# Patient Record
Sex: FEMALE | Race: WHITE | Employment: OTHER | URBAN - METROPOLITAN AREA
[De-identification: names, ages, dates, MRNs, and addresses within clinical notes are randomized per-mention and may not be internally consistent; named-entity substitution may affect disease eponyms.]

---

## 2022-11-21 ENCOUNTER — OFFICE VISIT (OUTPATIENT)
Dept: PRIMARY CARE CLINIC | Age: 77
End: 2022-11-21
Payer: MEDICARE

## 2022-11-21 VITALS
WEIGHT: 167 LBS | BODY MASS INDEX: 30.54 KG/M2 | DIASTOLIC BLOOD PRESSURE: 70 MMHG | OXYGEN SATURATION: 93 % | SYSTOLIC BLOOD PRESSURE: 120 MMHG | TEMPERATURE: 98.3 F | HEART RATE: 94 BPM

## 2022-11-21 DIAGNOSIS — I77.6 VASCULITIS (HCC): Primary | ICD-10-CM

## 2022-11-21 PROCEDURE — 4004F PT TOBACCO SCREEN RCVD TLK: CPT | Performed by: PHYSICIAN ASSISTANT

## 2022-11-21 PROCEDURE — G8419 CALC BMI OUT NRM PARAM NOF/U: HCPCS | Performed by: PHYSICIAN ASSISTANT

## 2022-11-21 PROCEDURE — 3017F COLORECTAL CA SCREEN DOC REV: CPT | Performed by: PHYSICIAN ASSISTANT

## 2022-11-21 PROCEDURE — G8400 PT W/DXA NO RESULTS DOC: HCPCS | Performed by: PHYSICIAN ASSISTANT

## 2022-11-21 PROCEDURE — 1123F ACP DISCUSS/DSCN MKR DOCD: CPT | Performed by: PHYSICIAN ASSISTANT

## 2022-11-21 PROCEDURE — G8427 DOCREV CUR MEDS BY ELIG CLIN: HCPCS | Performed by: PHYSICIAN ASSISTANT

## 2022-11-21 PROCEDURE — G8484 FLU IMMUNIZE NO ADMIN: HCPCS | Performed by: PHYSICIAN ASSISTANT

## 2022-11-21 PROCEDURE — 1090F PRES/ABSN URINE INCON ASSESS: CPT | Performed by: PHYSICIAN ASSISTANT

## 2022-11-21 PROCEDURE — 99213 OFFICE O/P EST LOW 20 MIN: CPT | Performed by: PHYSICIAN ASSISTANT

## 2022-11-21 RX ORDER — PREDNISONE 20 MG/1
20 TABLET ORAL 2 TIMES DAILY
Qty: 10 TABLET | Refills: 0 | Status: SHIPPED | OUTPATIENT
Start: 2022-11-21 | End: 2022-11-26

## 2022-11-21 ASSESSMENT — ENCOUNTER SYMPTOMS: RESPIRATORY NEGATIVE: 1

## 2022-11-21 NOTE — PROGRESS NOTES
[place] [address] [place]  [address] [facility]  Phone: [phone]  Fax: Daryl Acevedo    Pt Name: Nancie Mendoza  MRN: [id number]  [place] 1945  Date of evaluation: 11/21/2022  Provider: Negra Trinh PA-C     CHIEF COMPLAINT       Chief Complaint   Patient presents with    Cellulitis     Right leg redness, swelling, warm to the touch. Just completed a 5 day antibiotic from her family doc but it didn't help. She states that she was on Doxycycline. HISTORY OF PRESENT ILLNESS  (Location/Symptom, Timing/Onset, Context/Setting, Quality, Duration, Modifying Factors, Severity.)   Nancie Mendoza is a 78 y.o. White (non-) [1] female who presents to the office for evaluation of      Patient presents for possible cellulitis- Patient was just on 5 days of doxycycline with no improvement- Patients PCP indicated they thought it might be vascular related       Nursing Notes were reviewed. REVIEW OF SYSTEMS    (2-9 systems for level 4, 10 or more for level 5)     Review of Systems   Constitutional:  Negative for chills, diaphoresis, fatigue and fever. HENT: Negative. Respiratory: Negative. Cardiovascular: Negative. Skin:         Right lower leg/ right ankle redness and very mild swelling. - minor itching. Except as noted above the remainder of the review of systems was reviewed andnegative. PAST MEDICAL HISTORY   History reviewed. Past Medical History:   Diagnosis Date    Arthritis     PBC (primary biliary cirrhosis) ([facility] [address]     Thyroid disease          SURGICAL HISTORY     History reviewed.     Past Surgical History:   Procedure Laterality Date    CHOLECYSTECTOMY      EYE SURGERY Right     scleral buckle    HYSTERECTOMY      TONSILLECTOMY           CURRENT MEDICATIONS       Current Outpatient Medications   Medication Sig Dispense Refill    predniSONE (DELTASONE) 20 MG tablet Take 1 tablet by mouth 2 times daily for 5 days 10 tablet 0    HYDROcodone-acetaminophen (NORCO) 5-325 MG per tablet Take 1 tablet by mouth every 6 hours as needed for Pain.      vitamin B-12 (CYANOCOBALAMIN) 1000 MCG tablet Take 1,000 mcg by mouth daily. ursodiol (ACTIGALL) 300 MG capsule Take 500 mg by mouth 2 times daily. zolpidem (AMBIEN) 10 MG tablet Take  by mouth nightly as needed for Sleep.      raloxifene (EVISTA) 60 MG tablet Take 60 mg by mouth daily. levothyroxine (SYNTHROID) 112 MCG tablet Take 112 mcg by mouth Daily. ALPRAZolam (XANAX) 0.25 MG tablet Take 0.25 mg by mouth nightly as needed for Sleep. brimonidine (ALPHAGAN) 0.2 % ophthalmic solution One drop to operative eye twice daily. 1 Bottle 0    atropine 1 % ophthalmic solution One drop to operative eye daily 1 Bottle 0    gentamicin-prednisoLONE (PRED-G) 0.3-1 % ophthalmic drops Apply one drop to operative eye 4 times daily 1 Bottle 0    tobramycin-dexamethasone (TOBRADEX) 0.3-0.1 % ophthalmic ointment apply to operative eye three times daily as needed for comfort 1 Tube 0     No current facility-administered medications for this visit. ALLERGIES     Pcn [penicillins]    FAMILY HISTORY           Problem Relation Age of Onset    Diabetes Sister     Diabetes Brother     Stroke Brother      No family status information on file. SOCIAL HISTORY      reports that she has never smoked. She does not have any smokeless tobacco history on file. PHYSICAL EXAM    (up to 7 for level 4, 8 or more for level 5)     Vitals:    11/21/22 1430   BP: 120/70   Site: Left Upper Arm   Position: Sitting   Cuff Size: Medium Adult   Pulse: 94   Temp: 98.3 °F (36.8 °C)   SpO2: 93%   Weight: 167 lb (75.8 kg)         Physical Exam  Vitals and nursing note reviewed. Constitutional:       General: She is not in acute distress. Appearance: Normal appearance. She is not ill-appearing. HENT:      Head: Normocephalic and atraumatic.       Right Ear: External ear normal. Left Ear: External ear normal.      Nose: Nose normal.      Mouth/Throat:      Mouth: Mucous membranes are moist.   Eyes:      Extraocular Movements: Extraocular movements intact. Conjunctiva/sclera: Conjunctivae normal.      Pupils: Pupils are equal, round, and reactive to light. Pulmonary:      Effort: Pulmonary effort is normal.   Abdominal:      Palpations: Abdomen is soft. Musculoskeletal:        Legs:    Neurological:      Mental Status: She is alert and oriented to person, place, and time. DIFFERENTIAL DIAGNOSIS:       Brooke Hackett reviewed the disposition diagnosis with the patient and or their family/guardian. I have answered their questions and given discharge instructions. They voiced understanding of these instructions and did not have anyfurther questions or complaints. PROCEDURES:  No orders of the defined types were placed in this encounter. No results found for this visit on 11/21/22. FINALIMPRESSION      Visit Diagnoses and Associated Orders       Vasculitis ([facility] [address]    -  Primary         ORDERS WITHOUT AN ASSOCIATED DIAGNOSIS    predniSONE (DELTASONE) 20 MG tablet [6496]                PLAN     Return if symptoms worsen or fail to improve. DISCHARGEMEDICATIONS:  Orders Placed This Encounter   Medications    predniSONE (DELTASONE) 20 MG tablet     Sig: Take 1 tablet by mouth 2 times daily for 5 days     Dispense:  10 tablet     Refill:  0         Plan:  Based on presenting symptoms I believe this may be vasculitis   Prednisone given  - patient educated on signs and symptoms of cellulitis   Patient instructed to return to the office if symptoms worsen, return, or have any other concerns. Patient understands and is agreeable.          Minoo Cullen PA-C 11/21/2022 3:22 PM

## 2023-05-20 ENCOUNTER — HOSPITAL ENCOUNTER (OUTPATIENT)
Age: 78
Setting detail: SPECIMEN
Discharge: HOME | End: 2023-05-20

## 2023-05-20 ENCOUNTER — OFFICE VISIT (OUTPATIENT)
Dept: PRIMARY CARE CLINIC | Age: 78
End: 2023-05-20
Payer: MEDICARE

## 2023-05-20 VITALS
WEIGHT: 159 LBS | SYSTOLIC BLOOD PRESSURE: 114 MMHG | TEMPERATURE: 98.8 F | DIASTOLIC BLOOD PRESSURE: 70 MMHG | HEART RATE: 87 BPM | OXYGEN SATURATION: 100 % | BODY MASS INDEX: 29.08 KG/M2

## 2023-05-20 DIAGNOSIS — R35.0 URINARY FREQUENCY: Primary | ICD-10-CM

## 2023-05-20 DIAGNOSIS — R35.0 URINARY FREQUENCY: ICD-10-CM

## 2023-05-20 LAB
BILIRUBIN, POC: ABNORMAL
BLOOD URINE, POC: ABNORMAL
CLARITY, POC: ABNORMAL
COLOR, POC: ABNORMAL
GLUCOSE URINE, POC: NEGATIVE
KETONES, POC: NEGATIVE
LEUKOCYTE EST, POC: ABNORMAL
NITRITE, POC: NEGATIVE
PH, POC: 5.5
PROTEIN, POC: ABNORMAL
SPECIFIC GRAVITY, POC: 1.02
UROBILINOGEN, POC: 1

## 2023-05-20 PROCEDURE — 1123F ACP DISCUSS/DSCN MKR DOCD: CPT | Performed by: FAMILY MEDICINE

## 2023-05-20 PROCEDURE — 99214 OFFICE O/P EST MOD 30 MIN: CPT | Performed by: FAMILY MEDICINE

## 2023-05-20 PROCEDURE — G8400 PT W/DXA NO RESULTS DOC: HCPCS | Performed by: FAMILY MEDICINE

## 2023-05-20 PROCEDURE — 81003 URINALYSIS AUTO W/O SCOPE: CPT | Performed by: FAMILY MEDICINE

## 2023-05-20 PROCEDURE — 1036F TOBACCO NON-USER: CPT | Performed by: FAMILY MEDICINE

## 2023-05-20 PROCEDURE — 1090F PRES/ABSN URINE INCON ASSESS: CPT | Performed by: FAMILY MEDICINE

## 2023-05-20 PROCEDURE — G8427 DOCREV CUR MEDS BY ELIG CLIN: HCPCS | Performed by: FAMILY MEDICINE

## 2023-05-20 PROCEDURE — G8419 CALC BMI OUT NRM PARAM NOF/U: HCPCS | Performed by: FAMILY MEDICINE

## 2023-05-20 RX ORDER — SULFAMETHOXAZOLE AND TRIMETHOPRIM 800; 160 MG/1; MG/1
1 TABLET ORAL 2 TIMES DAILY
Qty: 10 TABLET | Refills: 0 | Status: SHIPPED | OUTPATIENT
Start: 2023-05-20 | End: 2023-05-25

## 2023-05-20 RX ORDER — LACTULOSE 10 G/15ML
SOLUTION ORAL
COMMUNITY
Start: 2022-03-09

## 2023-05-20 RX ORDER — OMEPRAZOLE 20 MG/1
CAPSULE, DELAYED RELEASE ORAL
COMMUNITY
Start: 2023-03-13

## 2023-05-20 RX ORDER — URSODIOL 500 MG/1
TABLET, FILM COATED ORAL 2 TIMES DAILY
COMMUNITY
Start: 2023-05-20

## 2023-05-20 RX ORDER — PHENAZOPYRIDINE HYDROCHLORIDE 200 MG/1
200 TABLET, FILM COATED ORAL 3 TIMES DAILY PRN
Qty: 9 TABLET | Refills: 0 | Status: SHIPPED | OUTPATIENT
Start: 2023-05-20 | End: 2023-05-23

## 2023-05-20 RX ORDER — ASPIRIN 81 MG/1
TABLET ORAL
COMMUNITY
Start: 2023-03-13

## 2023-05-20 RX ORDER — FUROSEMIDE 20 MG/1
TABLET ORAL DAILY
COMMUNITY
Start: 2020-12-04

## 2023-05-20 NOTE — PROGRESS NOTES
Subjective:  Rosemarie Cullen presents for   Chief Complaint   Patient presents with    Urinary Frequency     Urinary frequency for a couple of days. 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 Vitals:    05/20/23 1244   BP: 114/70   Site: Left Upper Arm   Position: Sitting   Cuff Size: Medium Adult   Pulse: 87   Temp: 98.8 °F (37.1 °C)   TempSrc: Tympanic   SpO2: 100%   Weight: 159 lb (72.1 kg)       Constitutional: She is oriented to person, place, and time. She appears well-developed and well-nourished and in no acute distress. Answers all my questions appropriately. No cva tenderness    Abdomen: No distension noted.  + bowel sounds in all quadrants which are normoactive. No bruits noted. No masses could be palpated. No unusual pulsatile masses noted. To deep palpation, patient denied any significant pain. No rebound, guarding or rigidity noted to my exam.        Assessment:   Encounter Diagnosis   Name Primary?     Urinary frequency Yes         Plan:     Ua looks supsicoia    Push fluids    Medications Discontinued During This Encounter   Medication Reason    ursodiol (ACTIGALL) 300 MG capsule DOSE ADJUSTMENT    tobramycin-dexamethasone (TOBRADEX) 0.3-0.1 % ophthalmic ointment LIST CLEANUP    gentamicin-prednisoLONE (PRED-G) 0.3-1 % ophthalmic drops LIST CLEANUP    brimonidine (ALPHAGAN) 0.2 % ophthalmic solution LIST CLEANUP    atropine 1 % ophthalmic solution LIST CLEANUP     THE ABOVE NOTED DISCONTINUED MEDS MAY ONLY BE FROM 'CLEANING UP' THE MED

## 2023-05-21 LAB
MICROORGANISM SPEC CULT: ABNORMAL
SPECIMEN DESCRIPTION: ABNORMAL

## 2023-05-22 LAB
MICROORGANISM SPEC CULT: ABNORMAL
SPECIMEN DESCRIPTION: ABNORMAL

## 2023-07-21 ENCOUNTER — OFFICE VISIT (OUTPATIENT)
Dept: PRIMARY CARE CLINIC | Age: 78
End: 2023-07-21
Payer: MEDICARE

## 2023-07-21 VITALS
TEMPERATURE: 97.9 F | HEART RATE: 66 BPM | WEIGHT: 159 LBS | SYSTOLIC BLOOD PRESSURE: 118 MMHG | OXYGEN SATURATION: 98 % | BODY MASS INDEX: 29.08 KG/M2 | DIASTOLIC BLOOD PRESSURE: 62 MMHG

## 2023-07-21 DIAGNOSIS — L03.114 CELLULITIS OF LEFT UPPER EXTREMITY: Primary | ICD-10-CM

## 2023-07-21 PROCEDURE — 1090F PRES/ABSN URINE INCON ASSESS: CPT | Performed by: FAMILY MEDICINE

## 2023-07-21 PROCEDURE — G8419 CALC BMI OUT NRM PARAM NOF/U: HCPCS | Performed by: FAMILY MEDICINE

## 2023-07-21 PROCEDURE — G8427 DOCREV CUR MEDS BY ELIG CLIN: HCPCS | Performed by: FAMILY MEDICINE

## 2023-07-21 PROCEDURE — G8400 PT W/DXA NO RESULTS DOC: HCPCS | Performed by: FAMILY MEDICINE

## 2023-07-21 PROCEDURE — 99214 OFFICE O/P EST MOD 30 MIN: CPT | Performed by: FAMILY MEDICINE

## 2023-07-21 PROCEDURE — 1036F TOBACCO NON-USER: CPT | Performed by: FAMILY MEDICINE

## 2023-07-21 PROCEDURE — 1123F ACP DISCUSS/DSCN MKR DOCD: CPT | Performed by: FAMILY MEDICINE

## 2023-07-21 RX ORDER — DOXYCYCLINE HYCLATE 100 MG
100 TABLET ORAL 2 TIMES DAILY
Qty: 20 TABLET | Refills: 0 | Status: SHIPPED | OUTPATIENT
Start: 2023-07-21 | End: 2023-07-31

## 2024-06-24 ENCOUNTER — OFFICE VISIT (OUTPATIENT)
Dept: PRIMARY CARE CLINIC | Age: 79
End: 2024-06-24
Payer: MEDICARE

## 2024-06-24 VITALS
DIASTOLIC BLOOD PRESSURE: 66 MMHG | HEART RATE: 74 BPM | OXYGEN SATURATION: 98 % | WEIGHT: 160 LBS | BODY MASS INDEX: 29.26 KG/M2 | SYSTOLIC BLOOD PRESSURE: 112 MMHG | TEMPERATURE: 98.9 F

## 2024-06-24 DIAGNOSIS — L03.115 CELLULITIS OF RIGHT LOWER EXTREMITY: Primary | ICD-10-CM

## 2024-06-24 DIAGNOSIS — S61.412A SKIN TEAR OF LEFT HAND WITHOUT COMPLICATION, INITIAL ENCOUNTER: ICD-10-CM

## 2024-06-24 PROCEDURE — 99213 OFFICE O/P EST LOW 20 MIN: CPT | Performed by: PHYSICIAN ASSISTANT

## 2024-06-24 PROCEDURE — 1123F ACP DISCUSS/DSCN MKR DOCD: CPT | Performed by: PHYSICIAN ASSISTANT

## 2024-06-24 RX ORDER — SULFAMETHOXAZOLE AND TRIMETHOPRIM 800; 160 MG/1; MG/1
1 TABLET ORAL 2 TIMES DAILY
Qty: 14 TABLET | Refills: 0 | Status: SHIPPED | OUTPATIENT
Start: 2024-06-24 | End: 2024-07-01

## 2024-06-24 RX ORDER — ROMOSOZUMAB-AQQG 105 MG/1.17ML
210 INJECTION, SOLUTION SUBCUTANEOUS
COMMUNITY
Start: 2023-11-06

## 2024-06-24 NOTE — PROGRESS NOTES
[facility]  [address] [place] [address]  Phone: [phone]  Fax: [phone]       [facility] [place]    Pt Name: Leah Charlton  MRN: [id number]  Birthdate 1945  Date of evaluation: 6/24/2024  Provider: Carine Walsh PA-C     CHIEF COMPLAINT       Chief Complaint   Patient presents with    Hand Injury     Left hand abrasion for 2-4 days. Hit left hand in a door at home 2 days ago then hit same hand at daughters house 4 days ago.     Sore     Sore on right lower leg for 2 weeks.            HISTORY OF PRESENT ILLNESS  (Location/Symptom, Timing/Onset, Context/Setting, Quality, Duration, Modifying Factors, Severity.)   Leah Charlton is a 79 y.o. White (non-) [1] female who presents to the office for evaluation of      Patient presents for evaluation of left hand wound and sore to right lower leg x 2 weeks.         Nursing Notes were reviewed.    REVIEW OF SYSTEMS    (2-9 systems for level 4, 10 or more for level 5)     Review of Systems   Constitutional:  Negative for diaphoresis, fatigue and fever.   HENT: Negative.     Respiratory: Negative.     Cardiovascular: Negative.    Gastrointestinal: Negative.    Genitourinary: Negative.    Skin:  Positive for wound.        Skin tear to hand and sore on lower leg         Except as noted above the remainder of the review of systems was reviewed andnegative.       PAST MEDICAL HISTORY   History reviewed.    Past Medical History:   Diagnosis Date    Arthritis     PBC (primary biliary cirrhosis)     Thyroid disease          SURGICAL HISTORY     History reviewed.    Past Surgical History:   Procedure Laterality Date    CHOLECYSTECTOMY      EYE SURGERY Right     scleral buckle    HYSTERECTOMY (CERVIX STATUS UNKNOWN)      TONSILLECTOMY           CURRENT MEDICATIONS       Current Outpatient Medications   Medication Sig Dispense Refill    romosozumab-aqqg (EVENITY) 105 MG/1.17ML SOSY injection Inject 2.34 mLs into the skin every 30 days

## 2024-06-25 ASSESSMENT — ENCOUNTER SYMPTOMS
GASTROINTESTINAL NEGATIVE: 1
RESPIRATORY NEGATIVE: 1

## 2024-07-02 ENCOUNTER — OFFICE VISIT (OUTPATIENT)
Dept: PRIMARY CARE CLINIC | Age: 79
End: 2024-07-02
Payer: MEDICARE

## 2024-07-02 VITALS
BODY MASS INDEX: 28.72 KG/M2 | SYSTOLIC BLOOD PRESSURE: 106 MMHG | TEMPERATURE: 97.5 F | DIASTOLIC BLOOD PRESSURE: 64 MMHG | HEART RATE: 72 BPM | OXYGEN SATURATION: 99 % | WEIGHT: 157 LBS

## 2024-07-02 DIAGNOSIS — S61.411S: Primary | ICD-10-CM

## 2024-07-02 DIAGNOSIS — L03.113 CELLULITIS OF RIGHT WRIST: ICD-10-CM

## 2024-07-02 PROCEDURE — 99213 OFFICE O/P EST LOW 20 MIN: CPT | Performed by: PHYSICIAN ASSISTANT

## 2024-07-02 PROCEDURE — 1123F ACP DISCUSS/DSCN MKR DOCD: CPT | Performed by: PHYSICIAN ASSISTANT

## 2024-07-02 RX ORDER — HYDROCODONE BITARTRATE AND ACETAMINOPHEN 10; 325 MG/1; MG/1
TABLET ORAL
COMMUNITY
Start: 2024-07-01

## 2024-07-02 RX ORDER — LEVOTHYROXINE SODIUM 88 UG/1
TABLET ORAL
COMMUNITY
Start: 2024-05-30

## 2024-07-02 RX ORDER — DOXYCYCLINE HYCLATE 100 MG
100 TABLET ORAL 2 TIMES DAILY
Qty: 20 TABLET | Refills: 0 | Status: SHIPPED | OUTPATIENT
Start: 2024-07-02 | End: 2024-07-12

## 2024-07-02 ASSESSMENT — ENCOUNTER SYMPTOMS
GASTROINTESTINAL NEGATIVE: 1
RESPIRATORY NEGATIVE: 1

## 2024-07-02 NOTE — PROGRESS NOTES
improve.      DISCHARGEMEDICATIONS:  Orders Placed This Encounter   Medications    doxycycline hyclate (VIBRA-TABS) 100 MG tablet     Sig: Take 1 tablet by mouth 2 times daily for 10 days     Dispense:  20 tablet     Refill:  0    mupirocin (BACTROBAN) 2 % ointment     Sig: Apply topically 3 times daily.     Dispense:  1 each     Refill:  0         Plan:  Patient instructed to complete antibiotic prescription fully.  Warm compresses TID for 15 minutes at a time.  Tylenol/Motrin as needed for the discomfort/fever.  Patient agreeable to treatment plan.  Educational materials provided on AVS.  Follow up if symptoms do not improve/worsen.    The wound is cleansed, debrided of foreign material as much as possible, and dressed. The patient is alerted to watch for any signs of infection (redness, pus, pain, increased swelling or fever) and call if such occurs.    Patient instructed to return to the office if symptoms worsen, return, or have any other concerns.Patient understands and is agreeable.         Carine Walsh PA-C 7/2/2024 3:38 PM

## 2025-05-28 ENCOUNTER — HOSPITAL ENCOUNTER (EMERGENCY)
Age: 80
Discharge: HOME | End: 2025-05-28
Attending: EMERGENCY MEDICINE
Payer: MEDICARE

## 2025-05-28 VITALS
BODY MASS INDEX: 24.75 KG/M2 | RESPIRATION RATE: 18 BRPM | SYSTOLIC BLOOD PRESSURE: 113 MMHG | HEIGHT: 64 IN | WEIGHT: 145 LBS | DIASTOLIC BLOOD PRESSURE: 72 MMHG | TEMPERATURE: 97.7 F | OXYGEN SATURATION: 94 % | HEART RATE: 91 BPM

## 2025-05-28 DIAGNOSIS — T17.308A CHOKING, INITIAL ENCOUNTER: Primary | ICD-10-CM

## 2025-05-28 PROCEDURE — 99283 EMERGENCY DEPT VISIT LOW MDM: CPT

## 2025-05-28 ASSESSMENT — PAIN - FUNCTIONAL ASSESSMENT: PAIN_FUNCTIONAL_ASSESSMENT: NONE - DENIES PAIN

## 2025-05-28 NOTE — ED PROVIDER NOTES
[facility] Emergency Department  [address]  [facility] [address]  Phone: [phone]  Fax: [phone]      Patient Name:  Leah Charlton  Medical Record Number:  [id number]  YOB: 1945  Date of Service:  5/28/2025  Primary Care Physician:  Juanito Beckham MD      CHIEF COMPLAINT:       Chief Complaint   Patient presents with    food bolus     Patient was eating a sub sandwich tonight around 1830 and started choking. Patients airway is patent but states \" she feels like she still has something deep down stuck\". Patient is able to swallow her own secretions at this time.        HISTORY OF PRESENT ILLNESS:    Leah Charlton is a 80 y.o. female who presents with the complaint of staff just pain after a choking event.  Prior to arrival patient choked on a large bite of [employer] sandwich the event required the Heimlich maneuver which did bring up the bite of sandwich however after that she still had some difficulty swallowing and felt like something was still stuck.  Upon arrival patient does not have voice changes, she is able to drink half a glass of water, she is not hypoxic she is able to talk full sentences.    CURRENT MEDICATIONS:      Previous Medications    ALPRAZOLAM (XANAX) 0.25 MG TABLET    Take 0.25 mg by mouth nightly as needed for Sleep.    ASPIRIN 81 MG EC TABLET    Take by mouth    FUROSEMIDE (LASIX) 20 MG TABLET    Take by mouth daily    HYDROCODONE-ACETAMINOPHEN (NORCO)  MG PER TABLET        LACTULOSE (CHRONULAC) 10 GM/15ML SOLUTION    Take by mouth    LEVOTHYROXINE (SYNTHROID) 88 MCG TABLET        OMEPRAZOLE (PRILOSEC) 20 MG DELAYED RELEASE CAPSULE        RALOXIFENE (EVISTA) 60 MG TABLET    Take 60 mg by mouth daily.    ROMOSOZUMAB-AQQG (EVENITY) 105 MG/1.17ML SOSY INJECTION    Inject 2.34 mLs into the skin every 30 days    URSODIOL (ACTIGALL) 500 MG TABLET    2 times daily    VITAMIN B-12 (CYANOCOBALAMIN) 1000 MCG TABLET    Take 1 tablet by mouth daily    VITAMIN

## 2025-05-29 NOTE — ED PROVIDER NOTES
[facility] Emergency Department  [address]  [facility] [address]  Phone: [phone]  Fax: [phone]      Attending Physician Attestation    I performed a history and physical examination of the patient and discussed management with the mid level provideer. I reviewed the mid level provider's note and agree with the documented findings and plan of care. Any areas of disagreement are noted on the chart. I was personally present for the key portions of any procedures. I have documented in the chart those procedures where I was not present during the key portions. I have reviewed the emergency nurses triage note. I agree with the chief complaint, past medical history, past surgical history, allergies, medications, social and family history as documented unless otherwise noted below. Documentation of the HPI, Physical Exam and Medical Decision Making performed by mid level providers is based on my personal performance of the HPI, PE and MDM. For Physician Assistant/ Nurse Practitioner cases/documentation I have personally evaluated this patient and have completed at least one if not all key elements of the E/M (history, physical exam, and MDM). Additional findings are as noted.      CHIEF COMPLAINT       Chief Complaint   Patient presents with    food bolus     Patient was eating a sub sandwich tonight around 1830 and started choking. Patients airway is patent but states \" she feels like she still has something deep down stuck\". Patient is able to swallow her own secretions at this time.          PAST MEDICAL HISTORY    has a past medical history of Arthritis, PBC (primary biliary cirrhosis), and Thyroid disease.    SURGICAL HISTORY      has a past surgical history that includes Cholecystectomy; Hysterectomy; Tonsillectomy; and Eye surgery (Right).    CURRENT MEDICATIONS       Previous Medications    ALPRAZOLAM (XANAX) 0.25 MG TABLET    Take 0.25 mg by mouth nightly as needed for Sleep.

## 2025-05-29 NOTE — DISCHARGE INSTRUCTIONS
Call your PCP tomorrow you likely had upper endoscopy to check for stricture or swelling of the esophagus that is causing you to choke    Watch for signs of pneumonia over the next week these would be productive cough, high fevers, fatigue or generally feeling unwell    Please understand that at this time there is no evidence for a more serious underlying process, but that early in the process of an illness or injury, an emergency department workup can be falsely reassuring.  You should contact your family doctor within the next 48 hours for a follow up appointment    THANK YOU!!!    From [facility] and [facility]    On behalf of the Emergency Department staff at [facility], I would like to thank you for giving us the opportunity to address your health care needs and concerns.    We hope that during your visit, our service was delivered in a professional and caring manner. Please keep [facility] in mind as we walk with you down the path to your own personal wellness.     Please expect an automated text message or email from us so we can ask a few questions about your health and progress. Based on your answers, a clinician may call you back to offer help and instructions.    Please understand that early in the process of an illness or injury, an emergency department workup can be falsely reassuring.  If you notice any worsening, changing or persistent symptoms please call your family doctor or return to the ER immediately.     Tell us how we did during your visit at [email or web address]   and let us know about your experience